# Patient Record
(demographics unavailable — no encounter records)

---

## 2025-04-03 NOTE — IMAGING
[de-identified] : On examination patient has in the groin through passive hip flexion, internal/external rotation.  Mild restriction.  She has tenderness over the right paraspinal muscle, tenderness of the right gluteal region.  She has tightness in the hamstring straight leg raise bilaterally.  Good range of motion of the right knee.  Ambulating without an assistive device.  X-ray pelvis right hip 2 views today no acute bony normality X-ray lumbar spine 4 views today multilevel degenerative change

## 2025-04-03 NOTE — HISTORY OF PRESENT ILLNESS
[de-identified] : 68-year-old female comes in today for an evaluation of her "right leg" pain she notes pain from her lower back into her right groin down her thigh.  No specific injury or trauma.  Difficulty walking.  Notes "shock" sensation radiating to her knee.  Accompanied by family member.  She is taking Tylenol.  Avoids NSAIDs as per her cardiologist history of cardiac stents.  History of hypertension, high cholesterol.  Takes losartan, aspirin, amlodipine, rosuvastatin, Cymbalta.

## 2025-04-03 NOTE — ASSESSMENT
[FreeTextEntry1] : At this time I do feel most of her symptoms may be stemming from her lumbar spine.  Physical therapy prescription was provided.  Heat is appropriate.  She is to check with her cardiologist prior to taking any NSAIDs.  Tylenol for now.  Follow-up in our spine department in several weeks for repeat evaluation.